# Patient Record
Sex: FEMALE | HISPANIC OR LATINO
[De-identification: names, ages, dates, MRNs, and addresses within clinical notes are randomized per-mention and may not be internally consistent; named-entity substitution may affect disease eponyms.]

---

## 2022-07-18 PROBLEM — Z00.00 ENCOUNTER FOR PREVENTIVE HEALTH EXAMINATION: Status: ACTIVE | Noted: 2022-07-18

## 2022-07-26 ENCOUNTER — APPOINTMENT (OUTPATIENT)
Dept: VASCULAR SURGERY | Facility: CLINIC | Age: 67
End: 2022-07-26

## 2022-07-26 VITALS
RESPIRATION RATE: 16 BRPM | HEIGHT: 60 IN | SYSTOLIC BLOOD PRESSURE: 115 MMHG | BODY MASS INDEX: 35.73 KG/M2 | HEART RATE: 95 BPM | DIASTOLIC BLOOD PRESSURE: 71 MMHG | WEIGHT: 182 LBS | TEMPERATURE: 97.2 F | OXYGEN SATURATION: 95 %

## 2022-07-26 DIAGNOSIS — I83.893 VARICOSE VEINS OF BILATERAL LOWER EXTREMITIES WITH OTHER COMPLICATIONS: ICD-10-CM

## 2022-07-26 PROCEDURE — 99203 OFFICE O/P NEW LOW 30 MIN: CPT

## 2022-07-26 PROCEDURE — 93970 EXTREMITY STUDY: CPT

## 2022-07-26 RX ORDER — METFORMIN HYDROCHLORIDE 1000 MG/1
1000 TABLET, COATED ORAL
Refills: 0 | Status: ACTIVE | COMMUNITY

## 2022-07-26 RX ORDER — ATORVASTATIN CALCIUM 40 MG/1
40 TABLET, FILM COATED ORAL
Refills: 0 | Status: ACTIVE | COMMUNITY

## 2022-07-26 RX ORDER — LISINOPRIL 2.5 MG/1
2.5 TABLET ORAL
Refills: 0 | Status: ACTIVE | COMMUNITY

## 2022-07-26 RX ORDER — ERTUGLIFLOZIN 15 MG/1
15 TABLET, FILM COATED ORAL
Refills: 0 | Status: ACTIVE | COMMUNITY

## 2022-07-26 NOTE — ASSESSMENT
[FreeTextEntry1] : 65 yo female with painful right calf varicose vein and several reticular veins in ankles and feet. \par \par Pt counseled on results of duplex and above diagnosis.\par Plan for right calf varicose vein varithena injection. Discussed the risks and benefits of the procedure with the patient. All questions answered.\par Plan for several sessions of BLE sclerotherapy for reticular and spider veins in ankles. Discussed the risks and benefits of the procedure with the patient. All questions answered.\par \par A total of 30 minutes was spent with patient and coordinating care.\par

## 2022-07-26 NOTE — PHYSICAL EXAM
[2+] : left 2+ [Ankle Swelling (On Exam)] : present [Ankle Swelling Bilaterally] : bilaterally  [Varicose Veins Of Lower Extremities] : present [Varicose Veins Of The Right Leg] : of the right leg [] : bilaterally [Ankle Swelling On The Right] : mild [Alert] : alert [Oriented to Person] : oriented to person [Oriented to Place] : oriented to place [Oriented to Time] : oriented to time [Calm] : calm [Skin Ulcer] : no ulcer [de-identified] : NAD. well appearing  [FreeTextEntry1] : Many spider and reticular veins at ankles bilaterally

## 2022-07-26 NOTE — PROCEDURE
[FreeTextEntry1] : Studies: \par 7/26/22 BLE venous duplex: \par right: GSV closed SFJ to knee. Chronic scarring in CFV, SFV, pop, gastoc and soleal veins. No acute DVT. Tributary veins noted in right posterior calf \par left: GSV with > 1 sec reflux. No DVT

## 2022-07-26 NOTE — HISTORY OF PRESENT ILLNESS
[FreeTextEntry1] : 67 yo female PMHx DM, HLD, HTN presents with many reticular veins in feet bilaterally. Pt had a right leg GSV procedure about 5 years ago which did not improve her veins. She feels the ankles and feet are painful towards the end of the day. She also has a large varicose vein in her right posterior calf area. She does not currently use compression stockings. She is not on any anticoagulation

## 2023-06-30 ENCOUNTER — OFFICE (OUTPATIENT)
Dept: URBAN - METROPOLITAN AREA CLINIC 115 | Facility: CLINIC | Age: 68
Setting detail: OPHTHALMOLOGY
End: 2023-06-30
Payer: MEDICARE

## 2023-06-30 DIAGNOSIS — H40.013: ICD-10-CM

## 2023-06-30 DIAGNOSIS — H25.13: ICD-10-CM

## 2023-06-30 DIAGNOSIS — H17.9: ICD-10-CM

## 2023-06-30 DIAGNOSIS — E11.3293: ICD-10-CM

## 2023-06-30 PROCEDURE — 92250 FUNDUS PHOTOGRAPHY W/I&R: CPT | Performed by: OPHTHALMOLOGY

## 2023-06-30 PROCEDURE — 99214 OFFICE O/P EST MOD 30 MIN: CPT | Performed by: OPHTHALMOLOGY

## 2023-06-30 ASSESSMENT — REFRACTION_MANIFEST
OS_SPHERE: +3.75
OD_VA1: 20/40
OS_AXIS: 105
OS_ADD: +2.75
OS_CYLINDER: -1.00
OD_SPHERE: +3.25
OD_AXIS: 075
OD_VA1: 20/40-
OD_ADD: +2.75
OS_VA1: 20/25
OS_SPHERE: +3.50
OS_AXIS: 090
OD_CYLINDER: -1.25
OS_VA1: 20/25
OS_CYLINDER: -1.00
OD_AXIS: 065
OD_CYLINDER: -1.00
OD_SPHERE: +3.50

## 2023-06-30 ASSESSMENT — REFRACTION_AUTOREFRACTION
OD_SPHERE: +3.50
OS_SPHERE: +3.75
OD_CYLINDER: -1.50
OS_CYLINDER: -1.25
OD_AXIS: 073
OS_AXIS: 092

## 2023-06-30 ASSESSMENT — SPHEQUIV_DERIVED
OS_SPHEQUIV: 3
OD_SPHEQUIV: 2.75
OS_SPHEQUIV: 3.25
OD_SPHEQUIV: 2.75
OD_SPHEQUIV: 2.875
OS_SPHEQUIV: 3.125

## 2023-06-30 ASSESSMENT — REFRACTION_CURRENTRX
OD_OVR_VA: 20/
OS_ADD: +2.50
OD_VPRISM_DIRECTION: PROGS
OD_CYLINDER: -1.00
OS_OVR_VA: 20/
OS_VPRISM_DIRECTION: PROGS
OS_SPHERE: +4.00
OD_AXIS: 079
OS_AXIS: 103
OS_CYLINDER: -1.50
OD_SPHERE: +3.25
OD_ADD: +2.50

## 2023-06-30 ASSESSMENT — CONFRONTATIONAL VISUAL FIELD TEST (CVF)
OD_FINDINGS: FULL
OS_FINDINGS: FULL

## 2023-06-30 ASSESSMENT — TONOMETRY
OS_IOP_MMHG: 17
OD_IOP_MMHG: 16

## 2023-06-30 ASSESSMENT — VISUAL ACUITY
OD_BCVA: 20/70-2
OS_BCVA: 20/80

## 2023-06-30 ASSESSMENT — CORNEAL SURGICAL SCARRING: OD_SCARRING: CENTRAL STROMAL

## 2023-07-15 ENCOUNTER — OFFICE (OUTPATIENT)
Dept: URBAN - METROPOLITAN AREA CLINIC 94 | Facility: CLINIC | Age: 68
Setting detail: OPHTHALMOLOGY
End: 2023-07-15
Payer: MEDICARE

## 2023-07-15 DIAGNOSIS — E11.3213: ICD-10-CM

## 2023-07-15 DIAGNOSIS — E11.3211: ICD-10-CM

## 2023-07-15 PROCEDURE — 67210 TREATMENT OF RETINAL LESION: CPT | Performed by: OPHTHALMOLOGY

## 2023-07-15 PROCEDURE — 92134 CPTRZ OPH DX IMG PST SGM RTA: CPT | Performed by: OPHTHALMOLOGY

## 2023-07-15 ASSESSMENT — REFRACTION_MANIFEST
OS_CYLINDER: -1.00
OD_VA1: 20/40-
OD_CYLINDER: -1.25
OS_ADD: +2.75
OS_SPHERE: +3.75
OS_VA1: 20/25
OS_VA1: 20/25
OD_AXIS: 075
OS_AXIS: 090
OS_CYLINDER: -1.00
OS_AXIS: 105
OD_SPHERE: +3.50
OD_VA1: 20/40
OD_ADD: +2.75
OD_CYLINDER: -1.00
OD_SPHERE: +3.25
OS_SPHERE: +3.50
OD_AXIS: 065

## 2023-07-15 ASSESSMENT — SPHEQUIV_DERIVED
OS_SPHEQUIV: 3.125
OS_SPHEQUIV: 3
OD_SPHEQUIV: 2.75
OD_SPHEQUIV: 2.875
OS_SPHEQUIV: 3.25
OD_SPHEQUIV: 2.75

## 2023-07-15 ASSESSMENT — REFRACTION_CURRENTRX
OD_AXIS: 079
OS_AXIS: 103
OD_ADD: +2.50
OS_ADD: +2.50
OD_VPRISM_DIRECTION: PROGS
OD_SPHERE: +3.25
OS_OVR_VA: 20/
OD_OVR_VA: 20/
OS_SPHERE: +4.00
OS_CYLINDER: -1.50
OD_CYLINDER: -1.00
OS_VPRISM_DIRECTION: PROGS

## 2023-07-15 ASSESSMENT — REFRACTION_AUTOREFRACTION
OD_CYLINDER: -1.50
OD_SPHERE: +3.50
OS_CYLINDER: -1.25
OS_AXIS: 092
OD_AXIS: 073
OS_SPHERE: +3.75

## 2023-07-15 ASSESSMENT — CORNEAL SURGICAL SCARRING: OD_SCARRING: CENTRAL STROMAL

## 2023-07-15 ASSESSMENT — CONFRONTATIONAL VISUAL FIELD TEST (CVF)
OS_FINDINGS: FULL
OD_FINDINGS: FULL

## 2023-07-15 ASSESSMENT — VISUAL ACUITY
OD_BCVA: 20/30
OS_BCVA: 20/30

## 2023-07-15 ASSESSMENT — TONOMETRY
OS_IOP_MMHG: 16
OD_IOP_MMHG: 18

## 2023-07-25 ENCOUNTER — ASC (OUTPATIENT)
Dept: URBAN - METROPOLITAN AREA SURGERY 8 | Facility: SURGERY | Age: 68
Setting detail: OPHTHALMOLOGY
End: 2023-07-25
Payer: MEDICARE

## 2023-07-25 DIAGNOSIS — E11.3212: ICD-10-CM

## 2023-07-25 PROCEDURE — 67210 TREATMENT OF RETINAL LESION: CPT | Performed by: OPHTHALMOLOGY

## 2023-07-25 ASSESSMENT — TONOMETRY: OS_IOP_MMHG: 21

## 2023-07-25 ASSESSMENT — VISUAL ACUITY
OS_BCVA: 20/60
OD_BCVA: 20/70

## 2023-07-25 ASSESSMENT — REFRACTION_CURRENTRX
OD_ADD: +2.50
OD_VPRISM_DIRECTION: PROGS
OD_SPHERE: +3.25
OS_AXIS: 103
OS_VPRISM_DIRECTION: PROGS
OS_ADD: +2.50
OS_CYLINDER: -1.50
OD_OVR_VA: 20/
OS_OVR_VA: 20/
OD_AXIS: 079
OS_SPHERE: +4.00
OD_CYLINDER: -1.00

## 2023-07-25 ASSESSMENT — SPHEQUIV_DERIVED
OS_SPHEQUIV: 3.125
OD_SPHEQUIV: 2.75

## 2023-07-25 ASSESSMENT — REFRACTION_AUTOREFRACTION
OS_AXIS: 092
OS_SPHERE: +3.75
OS_CYLINDER: -1.25
OD_AXIS: 073
OD_CYLINDER: -1.50
OD_SPHERE: +3.50

## 2023-07-25 ASSESSMENT — CONFRONTATIONAL VISUAL FIELD TEST (CVF)
OD_FINDINGS: FULL
OS_FINDINGS: FULL

## 2023-07-25 ASSESSMENT — CORNEAL SURGICAL SCARRING: OD_SCARRING: CENTRAL STROMAL

## 2023-08-11 ENCOUNTER — OFFICE (OUTPATIENT)
Dept: URBAN - METROPOLITAN AREA CLINIC 116 | Facility: CLINIC | Age: 68
Setting detail: OPHTHALMOLOGY
End: 2023-08-11
Payer: MEDICARE

## 2023-08-11 DIAGNOSIS — E11.3211: ICD-10-CM

## 2023-08-11 DIAGNOSIS — H25.13: ICD-10-CM

## 2023-08-11 DIAGNOSIS — E11.3212: ICD-10-CM

## 2023-08-11 PROBLEM — H52.4 PRESBYOPIA: Status: ACTIVE | Noted: 2023-08-11

## 2023-08-11 PROCEDURE — 99024 POSTOP FOLLOW-UP VISIT: CPT | Performed by: OPTOMETRIST

## 2023-08-11 ASSESSMENT — AXIALLENGTH_DERIVED
OS_AL: 22.5543
OD_AL: 22.7739
OD_AL: 22.8193
OS_AL: 22.5989

## 2023-08-11 ASSESSMENT — REFRACTION_CURRENTRX
OD_AXIS: 070
OS_ADD: +3.00
OS_OVR_VA: 20/
OD_CYLINDER: -0.75
OS_SPHERE: +4.00
OD_ADD: +2.75
OS_OVR_VA: 20/
OD_AXIS: 079
OS_SPHERE: +3.50
OD_VPRISM_DIRECTION: PROGS
OS_AXIS: 100
OS_AXIS: 103
OD_SPHERE: +3.25
OS_CYLINDER: -1.50
OD_CYLINDER: -1.00
OS_CYLINDER: -0.75
OD_ADD: +2.50
OS_ADD: +2.50
OS_VPRISM_DIRECTION: PROGS
OD_OVR_VA: 20/
OD_OVR_VA: 20/
OD_SPHERE: +3.25

## 2023-08-11 ASSESSMENT — REFRACTION_AUTOREFRACTION
OS_CYLINDER: -1.00
OS_AXIS: 100
OD_CYLINDER: -1.25
OD_SPHERE: +3.50
OS_SPHERE: +3.75
OD_AXIS: 075

## 2023-08-11 ASSESSMENT — KERATOMETRY
OS_K1POWER_DIOPTERS: 42.50
OS_K2POWER_DIOPTERS: 43.25
OD_K2POWER_DIOPTERS: 43.00
OD_K1POWER_DIOPTERS: 42.25
OD_AXISANGLE_DEGREES: 160
OS_AXISANGLE_DEGREES: 155

## 2023-08-11 ASSESSMENT — SPHEQUIV_DERIVED
OS_SPHEQUIV: 3.25
OD_SPHEQUIV: 3
OD_SPHEQUIV: 2.875
OS_SPHEQUIV: 3.375

## 2023-08-11 ASSESSMENT — REFRACTION_MANIFEST
OD_ADD: +2.50
OD_SPHERE: +3.50
OS_SPHERE: +3.75
OS_VA1: 20/40
OD_CYLINDER: -1.00
OD_AXIS: 075
OS_AXIS: 100
OD_VA1: 20/40
OS_ADD: +2.50
OS_CYLINDER: -0.75

## 2023-08-11 ASSESSMENT — TONOMETRY
OS_IOP_MMHG: 18
OD_IOP_MMHG: 21

## 2023-08-11 ASSESSMENT — VISUAL ACUITY
OD_BCVA: 20/70
OS_BCVA: 20/60

## 2023-08-11 ASSESSMENT — CORNEAL SURGICAL SCARRING
OS_SCARRING: STROMAL
OD_SCARRING: CENTRAL STROMAL

## 2023-08-11 ASSESSMENT — CONFRONTATIONAL VISUAL FIELD TEST (CVF)
OS_FINDINGS: FULL
OD_FINDINGS: FULL

## 2023-09-29 ENCOUNTER — OFFICE (OUTPATIENT)
Dept: URBAN - METROPOLITAN AREA CLINIC 115 | Facility: CLINIC | Age: 68
Setting detail: OPHTHALMOLOGY
End: 2023-09-29
Payer: MEDICAID

## 2023-09-29 DIAGNOSIS — H16.223: ICD-10-CM

## 2023-09-29 DIAGNOSIS — H17.9: ICD-10-CM

## 2023-09-29 DIAGNOSIS — H40.013: ICD-10-CM

## 2023-09-29 DIAGNOSIS — H25.13: ICD-10-CM

## 2023-09-29 PROCEDURE — 92083 EXTENDED VISUAL FIELD XM: CPT | Performed by: OPHTHALMOLOGY

## 2023-09-29 PROCEDURE — 99213 OFFICE O/P EST LOW 20 MIN: CPT | Performed by: OPHTHALMOLOGY

## 2023-09-29 ASSESSMENT — REFRACTION_CURRENTRX
OS_SPHERE: +3.50
OS_OVR_VA: 20/
OD_SPHERE: +3.25
OD_AXIS: 070
OD_CYLINDER: -1.00
OD_VPRISM_DIRECTION: PROGS
OD_SPHERE: +3.25
OD_OVR_VA: 20/
OS_CYLINDER: -1.50
OS_SPHERE: +4.00
OD_ADD: +2.50
OS_ADD: +2.50
OD_OVR_VA: 20/
OD_CYLINDER: -0.75
OS_ADD: +3.00
OS_AXIS: 103
OS_VPRISM_DIRECTION: PROGS
OD_AXIS: 079
OS_AXIS: 100
OS_CYLINDER: -0.75
OS_OVR_VA: 20/
OD_ADD: +2.75

## 2023-09-29 ASSESSMENT — KERATOMETRY
OS_AXISANGLE_DEGREES: 155
OD_K2POWER_DIOPTERS: 43.00
OD_K1POWER_DIOPTERS: 42.25
OS_K1POWER_DIOPTERS: 42.50
OS_K2POWER_DIOPTERS: 43.25
OD_AXISANGLE_DEGREES: 160

## 2023-09-29 ASSESSMENT — AXIALLENGTH_DERIVED
OS_AL: 22.5543
OS_AL: 22.5989
OD_AL: 22.8193
OD_AL: 22.7739

## 2023-09-29 ASSESSMENT — VISUAL ACUITY
OS_BCVA: 20/60
OD_BCVA: 20/70

## 2023-09-29 ASSESSMENT — SUPERFICIAL PUNCTATE KERATITIS (SPK)
OS_SPK: T
OD_SPK: T

## 2023-09-29 ASSESSMENT — REFRACTION_MANIFEST
OS_SPHERE: +3.75
OS_CYLINDER: -0.75
OD_CYLINDER: -1.00
OS_AXIS: 100
OS_ADD: +2.50
OD_VA1: 20/40
OD_SPHERE: +3.50
OS_VA1: 20/40
OD_ADD: +2.50
OD_AXIS: 075

## 2023-09-29 ASSESSMENT — REFRACTION_AUTOREFRACTION
OD_SPHERE: +3.50
OS_SPHERE: +3.75
OD_CYLINDER: -1.25
OS_CYLINDER: -1.00
OS_AXIS: 100
OD_AXIS: 075

## 2023-09-29 ASSESSMENT — CORNEAL SURGICAL SCARRING
OD_SCARRING: CENTRAL STROMAL
OS_SCARRING: STROMAL

## 2023-09-29 ASSESSMENT — CONFRONTATIONAL VISUAL FIELD TEST (CVF)
OD_FINDINGS: FULL
OS_FINDINGS: FULL

## 2023-09-29 ASSESSMENT — SPHEQUIV_DERIVED
OD_SPHEQUIV: 3
OD_SPHEQUIV: 2.875
OS_SPHEQUIV: 3.25
OS_SPHEQUIV: 3.375

## 2023-09-29 ASSESSMENT — TONOMETRY
OS_IOP_MMHG: 18
OD_IOP_MMHG: 21

## 2023-10-27 ENCOUNTER — OFFICE (OUTPATIENT)
Dept: URBAN - METROPOLITAN AREA CLINIC 115 | Facility: CLINIC | Age: 68
Setting detail: OPHTHALMOLOGY
End: 2023-10-27
Payer: MEDICAID

## 2023-10-27 ENCOUNTER — RX ONLY (RX ONLY)
Age: 68
End: 2023-10-27

## 2023-10-27 DIAGNOSIS — H17.9: ICD-10-CM

## 2023-10-27 DIAGNOSIS — H25.12: ICD-10-CM

## 2023-10-27 DIAGNOSIS — H16.223: ICD-10-CM

## 2023-10-27 DIAGNOSIS — H25.13: ICD-10-CM

## 2023-10-27 DIAGNOSIS — E11.3213: ICD-10-CM

## 2023-10-27 PROCEDURE — 92136 OPHTHALMIC BIOMETRY: CPT | Performed by: OPHTHALMOLOGY

## 2023-10-27 PROCEDURE — 92136 OPHTHALMIC BIOMETRY: CPT | Mod: TC | Performed by: OPHTHALMOLOGY

## 2023-10-27 PROCEDURE — 99214 OFFICE O/P EST MOD 30 MIN: CPT | Performed by: OPHTHALMOLOGY

## 2023-10-27 ASSESSMENT — REFRACTION_CURRENTRX
OD_CYLINDER: -1.00
OS_OVR_VA: 20/
OD_AXIS: 070
OS_SPHERE: +4.00
OD_SPHERE: +3.25
OD_CYLINDER: -0.75
OD_VPRISM_DIRECTION: PROGS
OS_ADD: +3.00
OS_SPHERE: +3.50
OD_ADD: +2.75
OS_AXIS: 103
OS_AXIS: 100
OD_AXIS: 079
OS_VPRISM_DIRECTION: PROGS
OD_ADD: +2.50
OS_ADD: +2.50
OD_SPHERE: +3.25
OS_CYLINDER: -1.50
OD_OVR_VA: 20/
OD_OVR_VA: 20/
OS_CYLINDER: -0.75
OS_OVR_VA: 20/

## 2023-10-27 ASSESSMENT — CONFRONTATIONAL VISUAL FIELD TEST (CVF)
OS_FINDINGS: FULL
OD_FINDINGS: FULL

## 2023-10-27 ASSESSMENT — KERATOMETRY
OS_K1POWER_DIOPTERS: 42.50
OS_AXISANGLE_DEGREES: 155
OD_AXISANGLE2_DEGREES: 160
OS_K1POWER_DIOPTERS: 42.50
OS_AXISANGLE_DEGREES: 65
OS_CYLPOWER_DEGREES: 0.75
OS_K2POWER_DIOPTERS: 43.25
OS_CYLAXISANGLE_DEGREES: 155
OS_K1K2_AVERAGE: 42.875
OD_CYLPOWER_DEGREES: 0.75
OD_K2POWER_DIOPTERS: 43.00
OD_AXISANGLE_DEGREES: 70
OD_AXISANGLE_DEGREES: 160
OD_K1POWER_DIOPTERS: 42.25
OD_CYLAXISANGLE_DEGREES: 160
OS_K2POWER_DIOPTERS: 43.25
OD_K1POWER_DIOPTERS: 42.25
OS_AXISANGLE2_DEGREES: 155
OD_K1K2_AVERAGE: 42.625
OD_K2POWER_DIOPTERS: 43.00

## 2023-10-27 ASSESSMENT — CORNEAL SURGICAL SCARRING
OS_SCARRING: STROMAL
OD_SCARRING: CENTRAL STROMAL

## 2023-10-27 ASSESSMENT — REFRACTION_MANIFEST
OS_VA1: 20/40
OD_AXIS: 075
OS_CYLINDER: -0.75
OS_ADD: +2.50
OD_CYLINDER: -1.00
OD_ADD: +2.50
OS_SPHERE: +3.75
OS_AXIS: 100
OD_VA1: 20/40
OD_SPHERE: +3.50

## 2023-10-27 ASSESSMENT — REFRACTION_AUTOREFRACTION
OD_SPHERE: +3.50
OD_CYLINDER: -1.25
OS_SPHERE: +4.25
OS_AXIS: 087
OD_AXIS: 077
OS_CYLINDER: -1.00

## 2023-10-27 ASSESSMENT — SUPERFICIAL PUNCTATE KERATITIS (SPK)
OS_SPK: T
OD_SPK: T

## 2023-10-27 ASSESSMENT — AXIALLENGTH_DERIVED
OD_AL: 22.7739
OS_AL: 22.5543
OD_AL: 22.8193
OS_AL: 22.4215

## 2023-10-27 ASSESSMENT — SPHEQUIV_DERIVED
OD_SPHEQUIV: 3
OD_SPHEQUIV: 2.875
OS_SPHEQUIV: 3.75
OS_SPHEQUIV: 3.375

## 2023-10-27 ASSESSMENT — TONOMETRY
OS_IOP_MMHG: 21
OD_IOP_MMHG: 20

## 2023-10-27 ASSESSMENT — VISUAL ACUITY
OS_BCVA: 20/80
OD_BCVA: 20/40-1

## 2023-12-09 ENCOUNTER — OFFICE (OUTPATIENT)
Dept: URBAN - METROPOLITAN AREA CLINIC 94 | Facility: CLINIC | Age: 68
Setting detail: OPHTHALMOLOGY
End: 2023-12-09
Payer: MEDICARE

## 2023-12-09 DIAGNOSIS — E11.3213: ICD-10-CM

## 2023-12-09 DIAGNOSIS — E11.3211: ICD-10-CM

## 2023-12-09 PROCEDURE — 92134 CPTRZ OPH DX IMG PST SGM RTA: CPT | Performed by: OPHTHALMOLOGY

## 2023-12-09 PROCEDURE — 92235 FLUORESCEIN ANGRPH MLTIFRAME: CPT | Performed by: OPHTHALMOLOGY

## 2023-12-09 PROCEDURE — 67210 TREATMENT OF RETINAL LESION: CPT | Mod: RT | Performed by: OPHTHALMOLOGY

## 2023-12-09 ASSESSMENT — SPHEQUIV_DERIVED
OD_SPHEQUIV: 2.875
OS_SPHEQUIV: 3.75

## 2023-12-09 ASSESSMENT — CONFRONTATIONAL VISUAL FIELD TEST (CVF)
OS_FINDINGS: FULL
OD_FINDINGS: FULL

## 2023-12-09 ASSESSMENT — REFRACTION_AUTOREFRACTION
OS_AXIS: 087
OD_CYLINDER: -1.25
OD_SPHERE: +3.50
OS_SPHERE: +4.25
OD_AXIS: 077
OS_CYLINDER: -1.00

## 2023-12-09 ASSESSMENT — CORNEAL SURGICAL SCARRING
OD_SCARRING: CENTRAL STROMAL
OS_SCARRING: STROMAL

## 2023-12-09 ASSESSMENT — SUPERFICIAL PUNCTATE KERATITIS (SPK)
OD_SPK: T
OS_SPK: T

## 2024-03-27 ENCOUNTER — OFFICE (OUTPATIENT)
Dept: URBAN - METROPOLITAN AREA CLINIC 94 | Facility: CLINIC | Age: 69
Setting detail: OPHTHALMOLOGY
End: 2024-03-27
Payer: MEDICARE

## 2024-03-27 DIAGNOSIS — E11.3211: ICD-10-CM

## 2024-03-27 DIAGNOSIS — E11.3213: ICD-10-CM

## 2024-03-27 PROCEDURE — 92235 FLUORESCEIN ANGRPH MLTIFRAME: CPT | Performed by: OPHTHALMOLOGY

## 2024-03-27 PROCEDURE — 92134 CPTRZ OPH DX IMG PST SGM RTA: CPT | Performed by: OPHTHALMOLOGY

## 2024-03-27 PROCEDURE — 67210 TREATMENT OF RETINAL LESION: CPT | Mod: RT | Performed by: OPHTHALMOLOGY

## 2024-04-02 ENCOUNTER — OFFICE (OUTPATIENT)
Dept: URBAN - METROPOLITAN AREA CLINIC 94 | Facility: CLINIC | Age: 69
Setting detail: OPHTHALMOLOGY
End: 2024-04-02
Payer: MEDICARE

## 2024-04-02 DIAGNOSIS — E11.3212: ICD-10-CM

## 2024-04-02 PROCEDURE — 67210 TREATMENT OF RETINAL LESION: CPT | Mod: 79,LT | Performed by: OPHTHALMOLOGY

## 2024-05-07 ENCOUNTER — OFFICE (OUTPATIENT)
Dept: URBAN - METROPOLITAN AREA CLINIC 94 | Facility: CLINIC | Age: 69
Setting detail: OPHTHALMOLOGY
End: 2024-05-07
Payer: MEDICARE

## 2024-05-07 DIAGNOSIS — E11.3213: ICD-10-CM

## 2024-05-07 PROCEDURE — 67028 INJECTION EYE DRUG: CPT | Mod: 58,50 | Performed by: OPHTHALMOLOGY

## 2024-05-07 PROCEDURE — 92134 CPTRZ OPH DX IMG PST SGM RTA: CPT | Performed by: OPHTHALMOLOGY

## 2024-06-18 ENCOUNTER — OFFICE (OUTPATIENT)
Dept: URBAN - METROPOLITAN AREA CLINIC 94 | Facility: CLINIC | Age: 69
Setting detail: OPHTHALMOLOGY
End: 2024-06-18
Payer: MEDICARE

## 2024-06-18 DIAGNOSIS — E11.3213: ICD-10-CM

## 2024-06-18 PROCEDURE — 92134 CPTRZ OPH DX IMG PST SGM RTA: CPT | Performed by: OPHTHALMOLOGY

## 2024-06-18 PROCEDURE — 67028 INJECTION EYE DRUG: CPT | Mod: 58,50 | Performed by: OPHTHALMOLOGY

## 2024-06-18 ASSESSMENT — CONFRONTATIONAL VISUAL FIELD TEST (CVF)
OD_FINDINGS: FULL
OS_FINDINGS: FULL

## 2024-07-16 ENCOUNTER — ASC (OUTPATIENT)
Dept: URBAN - METROPOLITAN AREA SURGERY 8 | Facility: SURGERY | Age: 69
Setting detail: OPHTHALMOLOGY
End: 2024-07-16
Payer: MEDICARE

## 2024-07-16 DIAGNOSIS — E11.3211: ICD-10-CM

## 2024-07-16 PROCEDURE — 67210 TREATMENT OF RETINAL LESION: CPT | Mod: RT | Performed by: OPHTHALMOLOGY

## 2024-07-16 ASSESSMENT — CONFRONTATIONAL VISUAL FIELD TEST (CVF)
OD_FINDINGS: FULL
OS_FINDINGS: FULL

## 2024-07-31 ENCOUNTER — OFFICE (OUTPATIENT)
Dept: URBAN - METROPOLITAN AREA CLINIC 94 | Facility: CLINIC | Age: 69
Setting detail: OPHTHALMOLOGY
End: 2024-07-31
Payer: MEDICARE

## 2024-07-31 DIAGNOSIS — E11.3213: ICD-10-CM

## 2024-07-31 PROCEDURE — 92134 CPTRZ OPH DX IMG PST SGM RTA: CPT | Performed by: OPHTHALMOLOGY

## 2024-07-31 PROCEDURE — 67028 INJECTION EYE DRUG: CPT | Mod: 58,50 | Performed by: OPHTHALMOLOGY

## 2024-07-31 ASSESSMENT — CONFRONTATIONAL VISUAL FIELD TEST (CVF)
OS_FINDINGS: FULL
OD_FINDINGS: FULL

## 2024-09-17 ENCOUNTER — OFFICE (OUTPATIENT)
Dept: URBAN - METROPOLITAN AREA CLINIC 94 | Facility: CLINIC | Age: 69
Setting detail: OPHTHALMOLOGY
End: 2024-09-17
Payer: MEDICARE

## 2024-09-17 DIAGNOSIS — E11.3213: ICD-10-CM

## 2024-09-17 DIAGNOSIS — E11.3211: ICD-10-CM

## 2024-09-17 PROCEDURE — 67028 INJECTION EYE DRUG: CPT | Mod: 58,50 | Performed by: OPHTHALMOLOGY

## 2024-09-17 PROCEDURE — 99024 POSTOP FOLLOW-UP VISIT: CPT | Performed by: OPHTHALMOLOGY

## 2024-09-17 PROCEDURE — 92134 CPTRZ OPH DX IMG PST SGM RTA: CPT | Performed by: OPHTHALMOLOGY

## 2024-09-25 ENCOUNTER — OFFICE (OUTPATIENT)
Dept: URBAN - METROPOLITAN AREA CLINIC 63 | Facility: CLINIC | Age: 69
Setting detail: OPHTHALMOLOGY
End: 2024-09-25
Payer: MEDICARE

## 2024-09-25 DIAGNOSIS — H17.9: ICD-10-CM

## 2024-09-25 DIAGNOSIS — E11.3212: ICD-10-CM

## 2024-09-25 DIAGNOSIS — H52.4: ICD-10-CM

## 2024-09-25 DIAGNOSIS — H40.013: ICD-10-CM

## 2024-09-25 DIAGNOSIS — H25.13: ICD-10-CM

## 2024-09-25 DIAGNOSIS — E11.3211: ICD-10-CM

## 2024-09-25 PROCEDURE — 99024 POSTOP FOLLOW-UP VISIT: CPT | Performed by: INTERNAL MEDICINE

## 2024-09-25 PROCEDURE — 92015 DETERMINE REFRACTIVE STATE: CPT | Performed by: INTERNAL MEDICINE

## 2024-09-30 ENCOUNTER — OFFICE (OUTPATIENT)
Dept: URBAN - METROPOLITAN AREA CLINIC 63 | Facility: CLINIC | Age: 69
Setting detail: OPHTHALMOLOGY
End: 2024-09-30
Payer: MEDICARE

## 2024-09-30 DIAGNOSIS — E11.3212: ICD-10-CM

## 2024-09-30 PROCEDURE — 67210 TREATMENT OF RETINAL LESION: CPT | Mod: 79,LT | Performed by: OPHTHALMOLOGY

## 2024-09-30 ASSESSMENT — CONFRONTATIONAL VISUAL FIELD TEST (CVF)
OS_FINDINGS: FULL
OD_FINDINGS: FULL

## 2024-10-21 ENCOUNTER — OFFICE (OUTPATIENT)
Dept: URBAN - METROPOLITAN AREA CLINIC 63 | Facility: CLINIC | Age: 69
Setting detail: OPHTHALMOLOGY
End: 2024-10-21
Payer: MEDICARE

## 2024-10-21 DIAGNOSIS — E11.3211: ICD-10-CM

## 2024-10-21 PROCEDURE — 67210 TREATMENT OF RETINAL LESION: CPT | Mod: 79,RT | Performed by: OPHTHALMOLOGY

## 2024-10-21 ASSESSMENT — REFRACTION_AUTOREFRACTION
OD_SPHERE: +3.50
OD_CYLINDER: -1.75
OS_SPHERE: +4.50
OS_AXIS: 093
OS_CYLINDER: -1.75
OD_AXIS: 073

## 2024-10-21 ASSESSMENT — KERATOMETRY
OD_K1POWER_DIOPTERS: 42.75
OD_AXISANGLE_DEGREES: 160
OS_AXISANGLE_DEGREES: 179
OS_K2POWER_DIOPTERS: 43.50
OD_K2POWER_DIOPTERS: 43.85
OS_K1POWER_DIOPTERS: 42.00

## 2024-10-21 ASSESSMENT — CORNEAL SURGICAL SCARRING
OS_SCARRING: STROMAL
OD_SCARRING: CENTRAL STROMAL

## 2024-10-21 ASSESSMENT — CONFRONTATIONAL VISUAL FIELD TEST (CVF)
OS_FINDINGS: FULL
OD_FINDINGS: FULL

## 2024-10-21 ASSESSMENT — SUPERFICIAL PUNCTATE KERATITIS (SPK)
OS_SPK: T
OD_SPK: T

## 2024-10-21 ASSESSMENT — TONOMETRY
OS_IOP_MMHG: 16
OD_IOP_MMHG: 16

## 2024-10-21 ASSESSMENT — VISUAL ACUITY
OD_BCVA: 20/40-1
OS_BCVA: 20/40-1

## 2024-10-29 ENCOUNTER — OFFICE (OUTPATIENT)
Dept: URBAN - METROPOLITAN AREA CLINIC 94 | Facility: CLINIC | Age: 69
Setting detail: OPHTHALMOLOGY
End: 2024-10-29
Payer: MEDICARE

## 2024-10-29 DIAGNOSIS — E11.3213: ICD-10-CM

## 2024-10-29 PROCEDURE — 67028 INJECTION EYE DRUG: CPT | Mod: 58,50 | Performed by: OPHTHALMOLOGY

## 2024-10-29 PROCEDURE — 92134 CPTRZ OPH DX IMG PST SGM RTA: CPT | Performed by: OPHTHALMOLOGY

## 2024-10-29 ASSESSMENT — KERATOMETRY
OD_K2POWER_DIOPTERS: 43.85
OS_K2POWER_DIOPTERS: 43.50
OD_AXISANGLE_DEGREES: 160
OS_AXISANGLE_DEGREES: 179
OD_K1POWER_DIOPTERS: 42.75
OS_K1POWER_DIOPTERS: 42.00

## 2024-10-29 ASSESSMENT — SUPERFICIAL PUNCTATE KERATITIS (SPK)
OD_SPK: T
OS_SPK: T

## 2024-10-29 ASSESSMENT — REFRACTION_AUTOREFRACTION
OD_CYLINDER: -1.75
OS_SPHERE: +4.50
OS_AXIS: 093
OS_CYLINDER: -1.75
OD_AXIS: 073
OD_SPHERE: +3.50

## 2024-10-29 ASSESSMENT — CONFRONTATIONAL VISUAL FIELD TEST (CVF)
OD_FINDINGS: FULL
OS_FINDINGS: FULL

## 2024-10-29 ASSESSMENT — CORNEAL SURGICAL SCARRING
OS_SCARRING: STROMAL
OD_SCARRING: CENTRAL STROMAL

## 2024-10-29 ASSESSMENT — VISUAL ACUITY
OD_BCVA: 20/60+1
OS_BCVA: 20/40

## 2024-10-29 ASSESSMENT — TONOMETRY
OD_IOP_MMHG: 17
OS_IOP_MMHG: 18

## 2024-12-16 ENCOUNTER — OFFICE (OUTPATIENT)
Dept: URBAN - METROPOLITAN AREA CLINIC 63 | Facility: CLINIC | Age: 69
Setting detail: OPHTHALMOLOGY
End: 2024-12-16
Payer: MEDICARE

## 2024-12-16 DIAGNOSIS — E11.3213: ICD-10-CM

## 2024-12-16 PROCEDURE — 92134 CPTRZ OPH DX IMG PST SGM RTA: CPT | Performed by: OPHTHALMOLOGY

## 2024-12-16 PROCEDURE — 67028 INJECTION EYE DRUG: CPT | Mod: 58,50 | Performed by: OPHTHALMOLOGY

## 2024-12-16 PROCEDURE — 99024 POSTOP FOLLOW-UP VISIT: CPT | Performed by: OPHTHALMOLOGY

## 2024-12-16 ASSESSMENT — REFRACTION_AUTOREFRACTION
OS_AXIS: 093
OS_CYLINDER: -1.75
OD_AXIS: 073
OD_SPHERE: +3.50
OS_SPHERE: +4.50
OD_CYLINDER: -1.75

## 2024-12-16 ASSESSMENT — KERATOMETRY
OS_AXISANGLE_DEGREES: 179
OS_K2POWER_DIOPTERS: 43.50
OD_K1POWER_DIOPTERS: 42.75
OD_AXISANGLE_DEGREES: 160
OS_K1POWER_DIOPTERS: 42.00
OD_K2POWER_DIOPTERS: 43.85

## 2024-12-16 ASSESSMENT — VISUAL ACUITY
OS_BCVA: 20/50-1
OD_BCVA: 20/60

## 2024-12-16 ASSESSMENT — TONOMETRY
OD_IOP_MMHG: 18
OS_IOP_MMHG: 18

## 2025-03-31 ENCOUNTER — OFFICE (OUTPATIENT)
Dept: URBAN - METROPOLITAN AREA CLINIC 63 | Facility: CLINIC | Age: 70
Setting detail: OPHTHALMOLOGY
End: 2025-03-31
Payer: MEDICARE

## 2025-03-31 DIAGNOSIS — E11.3211: ICD-10-CM

## 2025-03-31 DIAGNOSIS — E11.3213: ICD-10-CM

## 2025-03-31 PROCEDURE — 92134 CPTRZ OPH DX IMG PST SGM RTA: CPT | Performed by: OPHTHALMOLOGY

## 2025-03-31 PROCEDURE — 92235 FLUORESCEIN ANGRPH MLTIFRAME: CPT | Performed by: OPHTHALMOLOGY

## 2025-03-31 PROCEDURE — 67210 TREATMENT OF RETINAL LESION: CPT | Mod: RT | Performed by: OPHTHALMOLOGY

## 2025-03-31 ASSESSMENT — TONOMETRY: OD_IOP_MMHG: 20

## 2025-03-31 ASSESSMENT — CONFRONTATIONAL VISUAL FIELD TEST (CVF)
OD_FINDINGS: FULL
OS_FINDINGS: FULL

## 2025-03-31 ASSESSMENT — VISUAL ACUITY
OS_BCVA: 20/40-1
OD_BCVA: 20/50-1

## 2025-04-08 ENCOUNTER — OFFICE (OUTPATIENT)
Dept: URBAN - METROPOLITAN AREA CLINIC 94 | Facility: CLINIC | Age: 70
Setting detail: OPHTHALMOLOGY
End: 2025-04-08
Payer: MEDICARE

## 2025-04-08 DIAGNOSIS — E11.3212: ICD-10-CM

## 2025-04-08 PROCEDURE — 67210 TREATMENT OF RETINAL LESION: CPT | Mod: 79,LT | Performed by: OPHTHALMOLOGY

## 2025-04-08 ASSESSMENT — CORNEAL SURGICAL SCARRING
OD_SCARRING: CENTRAL STROMAL
OS_SCARRING: STROMAL

## 2025-04-08 ASSESSMENT — KERATOMETRY
OD_K1POWER_DIOPTERS: 42.75
OS_K2POWER_DIOPTERS: 43.50
OS_K1POWER_DIOPTERS: 42.00
OS_AXISANGLE_DEGREES: 179
OD_AXISANGLE_DEGREES: 160
OD_K2POWER_DIOPTERS: 43.85

## 2025-04-08 ASSESSMENT — TONOMETRY
OD_IOP_MMHG: 20
OS_IOP_MMHG: 20

## 2025-04-08 ASSESSMENT — REFRACTION_AUTOREFRACTION
OS_SPHERE: +4.50
OD_AXIS: 073
OD_SPHERE: +3.50
OS_CYLINDER: -1.75
OS_AXIS: 093
OD_CYLINDER: -1.75

## 2025-04-08 ASSESSMENT — VISUAL ACUITY
OS_BCVA: 20/40-
OD_BCVA: 20/40-

## 2025-04-08 ASSESSMENT — SUPERFICIAL PUNCTATE KERATITIS (SPK)
OS_SPK: T
OD_SPK: T

## 2025-04-24 ENCOUNTER — OFFICE (OUTPATIENT)
Dept: URBAN - METROPOLITAN AREA CLINIC 63 | Facility: CLINIC | Age: 70
Setting detail: OPHTHALMOLOGY
End: 2025-04-24
Payer: MEDICARE

## 2025-04-24 DIAGNOSIS — E11.3213: ICD-10-CM

## 2025-04-24 PROCEDURE — 92134 CPTRZ OPH DX IMG PST SGM RTA: CPT | Performed by: OPHTHALMOLOGY

## 2025-04-24 PROCEDURE — 99024 POSTOP FOLLOW-UP VISIT: CPT | Performed by: OPHTHALMOLOGY

## 2025-04-24 PROCEDURE — 67028 INJECTION EYE DRUG: CPT | Mod: 58,50 | Performed by: OPHTHALMOLOGY

## 2025-04-24 ASSESSMENT — KERATOMETRY
OS_AXISANGLE_DEGREES: 179
OD_K2POWER_DIOPTERS: 43.85
OS_K1POWER_DIOPTERS: 42.00
OD_K1POWER_DIOPTERS: 42.75
OS_K2POWER_DIOPTERS: 43.50
OD_AXISANGLE_DEGREES: 160

## 2025-04-24 ASSESSMENT — REFRACTION_AUTOREFRACTION
OS_CYLINDER: -1.75
OD_CYLINDER: -1.75
OD_AXIS: 073
OS_AXIS: 093
OD_SPHERE: +3.50
OS_SPHERE: +4.50

## 2025-04-24 ASSESSMENT — TONOMETRY
OS_IOP_MMHG: 20
OD_IOP_MMHG: 19

## 2025-04-24 ASSESSMENT — VISUAL ACUITY
OD_BCVA: 20/40-
OS_BCVA: 20/40-1

## 2025-09-09 ENCOUNTER — RESULT REVIEW (OUTPATIENT)
Age: 70
End: 2025-09-09

## 2025-09-16 ENCOUNTER — TRANSCRIPTION ENCOUNTER (OUTPATIENT)
Age: 70
End: 2025-09-16

## 2025-09-17 ENCOUNTER — TRANSCRIPTION ENCOUNTER (OUTPATIENT)
Age: 70
End: 2025-09-17

## 2025-09-19 ENCOUNTER — NON-APPOINTMENT (OUTPATIENT)
Age: 70
End: 2025-09-19

## 2025-09-24 PROBLEM — I48.91 AFIB: Status: ACTIVE | Noted: 2025-09-24

## 2025-09-24 PROBLEM — I50.9 OTHER CONGESTIVE HEART FAILURE: Status: ACTIVE | Noted: 2025-09-24

## 2025-09-24 PROBLEM — E78.5 HYPERLIPIDEMIA: Status: ACTIVE | Noted: 2025-09-24

## 2025-09-24 PROBLEM — I48.91 ATRIAL FIBRILLATION: Status: ACTIVE | Noted: 2025-09-24

## 2025-09-24 PROBLEM — I10 HYPERTENSION: Status: ACTIVE | Noted: 2025-09-24
